# Patient Record
Sex: FEMALE | Employment: FULL TIME
[De-identification: names, ages, dates, MRNs, and addresses within clinical notes are randomized per-mention and may not be internally consistent; named-entity substitution may affect disease eponyms.]

---

## 2017-10-22 ENCOUNTER — HEALTH MAINTENANCE LETTER (OUTPATIENT)
Age: 27
End: 2017-10-22

## 2018-01-08 ENCOUNTER — NURSE TRIAGE (OUTPATIENT)
Dept: NURSING | Facility: CLINIC | Age: 28
End: 2018-01-08

## 2018-01-09 NOTE — TELEPHONE ENCOUNTER
"Patient is calling with question about whether she could delay \"gettin her period\" by skipping her placebo days and \"just starting another set of BCP's\". She has a SO coming to town from far away and she does not want to have her period. Advised that she discuss with her provider in the morning. Yesterday was already her first placebo day, and today was day 2 so it may already be too late.  Carola Lawson RN  [facility]    Reason for Disposition    [1] Caller requesting NON-URGENT health information AND [2] PCP's office is the best resource    Additional Information    Negative: [1] Caller is not with the adult (patient) AND [2] reporting urgent symptoms    Negative: Lab result questions    Negative: Medication questions    Negative: Caller cannot be reached by phone    Negative: Caller has already spoken to PCP or another triager    Negative: RN needs further essential information from caller in order to complete triage    Negative: Requesting regular office appointment    Protocols used: INFORMATION ONLY CALL-ADULT-      "

## 2019-01-02 ENCOUNTER — NURSE TRIAGE (OUTPATIENT)
Dept: NURSING | Facility: CLINIC | Age: 29
End: 2019-01-02

## 2019-01-02 NOTE — TELEPHONE ENCOUNTER
"Patient is in [place], unable to triage. \"I am suppose to go [facility]. So I was give Doxycycline to take 2 days before I went, which is today. But for the past couple of days I've been having diarrhea. Should I still take the antibiotic?\" Unable to triage, gave advice on being seen there to dx cause of diarrhea and check on hydration status. Also advised if you go [facility], yes take the medication given per MD, but also add BRAT diet and a probiotic and yogurt daily. If sx worsen advised to be seen there in [place].   "

## 2019-06-21 ENCOUNTER — MEDICAL CORRESPONDENCE (OUTPATIENT)
Dept: HEALTH INFORMATION MANAGEMENT | Facility: CLINIC | Age: 29
End: 2019-06-21

## 2019-06-21 ENCOUNTER — TRANSFERRED RECORDS (OUTPATIENT)
Dept: HEALTH INFORMATION MANAGEMENT | Facility: CLINIC | Age: 29
End: 2019-06-21

## 2019-06-24 ENCOUNTER — OFFICE VISIT (OUTPATIENT)
Dept: DERMATOLOGY | Facility: CLINIC | Age: 29
End: 2019-06-24
Payer: COMMERCIAL

## 2019-06-24 DIAGNOSIS — L30.0 NUMMULAR ECZEMA: Primary | ICD-10-CM

## 2019-06-24 RX ORDER — TRIAMCINOLONE ACETONIDE 1 MG/G
OINTMENT TOPICAL
Qty: 80 G | Refills: 1 | Status: SHIPPED | OUTPATIENT
Start: 2019-06-24

## 2019-06-24 RX ORDER — NORGESTIMATE AND ETHINYL ESTRADIOL 0.25-0.035
KIT ORAL DAILY
COMMUNITY
Start: 2019-06-02

## 2019-06-24 ASSESSMENT — PAIN SCALES - GENERAL: PAINLEVEL: NO PAIN (0)

## 2019-06-24 NOTE — PROGRESS NOTES
[facility] Dermatology Note      Dermatology Problem List:    1. Nummular eczema  -TAC 0.1% ointment  -Previously was on unknown topical antifungals and one course of PO antifungals without improvement    Encounter Date: Jun 24, 2019    CC:   Chief Complaint   Patient presents with     Derm Problem     Possible nummular dermatitis - referred by [place].        History of Present Illness:  Ms. Montse Lock is a 29 year old female who presents as a referral from [facility]. Briefly over a year ago, the pt developed a new, pink, itchy spot on her left shin. She was given a series of topical antifungals, but it never resolved. She was even given a course of oral antifungals, but again, it did not improve. The pt notes ist was particualrly itchy in December 2018. In the winter of 2019, she was in [facility] and [facility], and she thinks it got better. One month after she returned from [facility], she developed a new rash on her abdomen. It was slightly at first, but it has subsequently improved. Again, the pt used topical antifungals, but the rash did not go away. She is no longer developing new areas of rash. She was recently Rx'ed a new topical steroid on Friday, but she has not started using it. Otherwise, the pt denies any other general or skin-specific complaints at this time.     Past Medical History:   No hx of asthma, allergies, or eczema    Social History:  Patient reports that she has never smoked. She has never used smokeless tobacco.    Family History:  Family History   Problem Relation Age of Onset     Melanoma No family hx of      Skin Cancer No family hx of        Medications:  Current Outpatient Medications   Medication Sig Dispense Refill     MONO-LINYAH 0.25-35 MG-MCG tablet daily       triamcinolone (KENALOG) 0.1 % external ointment Apply one to two times a day to areas red rash when present or itchy. Do not apply to face or groin 80 g 1        No Known Allergies      Review of  Systems:  -Constitutional: Otherwise feeling well today, in usual state of health.  -Skin: As above in HPI. No additional skin concerns.    Physical exam:  Vitals: There were no vitals taken for this visit.  GEN: This is a well developed, well-nourished female in no acute distress, in a pleasant mood.    SKIN: Focused examination of the face, neck, left left leg abdomen, back, right and left hands, fingers, eyelids, conjunctiva, and lips was performed.  -There is a pink, arcuate plaque on the left lateral shin with a fine amount of scale. There are no other similar lesions noted on examination of this area. No PIH is appreciated  -There are salmon pink circular macules and plaques scattered on the abdomen. There is a larger one on the right inframammary fold.   -No other lesions of concern on areas examined.     KOH prep: a KOH prep of skin from plaques on the trunk was performed in clinic, and it was negative for any fungal elements today    Impression/Plan:  1. Nummular eczema    At this time, given the location, symptoms, and failure to response to antifungals--as well as negative KOH prep--we believe this is a dermatitis. We believe she has nummular eczema on her left leg. The abdomen could be resolving CA or also nummular eczema    We will treat with TAC 0.1% ointment BID PRN. Told pt to stop using topical antifungals      Follow-up prn for new or changing lesions.       Dr. Rodriguez staffed the patient.    Staff Involved:  Resident([place])/Staff

## 2019-06-24 NOTE — PROGRESS NOTES
I talked with and examined Montse Lock and I agree with the assessment and the plan. JOHN Rodriguez MD.

## 2019-06-24 NOTE — PATIENT INSTRUCTIONS
1. Apply triamcinolone one to two times a day when red spots are present or itchy. Do not apply this steroid to the face or groin

## 2019-06-24 NOTE — LETTER
6/24/2019       RE: Montse Lock  [address]  [address]  [place] [address]     Dear Colleague,    Thank you for referring your patient, Montse Lock, to the [facility] DERMATOLOGY at [facility]. Please see a copy of my visit note below.    [facility] Dermatology Note      Dermatology Problem List:    1. Nummular eczema  -TAC 0.1% ointment  -Previously was on unknown topical antifungals and one course of PO antifungals without improvement    Encounter Date: Jun 24, 2019    CC:   Chief Complaint   Patient presents with     Derm Problem     Possible nummular dermatitis - referred by [place].        History of Present Illness:  Ms. Montse Lock is a 29 year old female who presents as a referral from [facility]. Briefly over a year ago, the pt developed a new, pink, itchy spot on her left shin. She was given a series of topical antifungals, but it never resolved. She was even given a course of oral antifungals, but again, it did not improve. The pt notes ist was particualrly itchy in December 2018. In the winter of 2019, she was in [facility] and [facility], and she thinks it got better. One month after she returned from [facility], she developed a new rash on her abdomen. It was slightly at first, but it has subsequently improved. Again, the pt used topical antifungals, but the rash did not go away. She is no longer developing new areas of rash. She was recently Rx'ed a new topical steroid on Friday, but she has not started using it. Otherwise, the pt denies any other general or skin-specific complaints at this time.     Past Medical History:   No hx of asthma, allergies, or eczema    Social History:  Patient reports that she has never smoked. She has never used smokeless tobacco.    Family History:  Family History   Problem Relation Age of Onset     Melanoma No family hx of      Skin Cancer No family hx of        Medications:  Current Outpatient  Medications   Medication Sig Dispense Refill     MONO-LINYAH 0.25-35 MG-MCG tablet daily       triamcinolone (KENALOG) 0.1 % external ointment Apply one to two times a day to areas red rash when present or itchy. Do not apply to face or groin 80 g 1        No Known Allergies      Review of Systems:  -Constitutional: Otherwise feeling well today, in usual state of health.  -Skin: As above in HPI. No additional skin concerns.    Physical exam:  Vitals: There were no vitals taken for this visit.  GEN: This is a well developed, well-nourished female in no acute distress, in a pleasant mood.    SKIN: Focused examination of the face, neck, left left leg abdomen, back, right and left hands, fingers, eyelids, conjunctiva, and lips was performed.  -There is a pink, arcuate plaque on the left lateral shin with a fine amount of scale. There are no other similar lesions noted on examination of this area. No PIH is appreciated  -There are salmon pink circular macules and plaques scattered on the abdomen. There is a larger one on the right inframammary fold.   -No other lesions of concern on areas examined.     KOH prep: a KOH prep of skin from plaques on the trunk was performed in clinic, and it was negative for any fungal elements today    Impression/Plan:  1. Nummular eczema    At this time, given the location, symptoms, and failure to response to antifungals--as well as negative KOH prep--we believe this is a dermatitis. We believe she has nummular eczema on her left leg. The abdomen could be resolving RI or also nummular eczema    We will treat with TAC 0.1% ointment BID PRN. Told pt to stop using topical antifungals      Follow-up prn for new or changing lesions.       Dr. Rodriguez staffed the patient.    Staff Involved:  Resident([place])/Staff      I talked with and examined Montse Lock and I agree with the assessment and the plan. JOHN Rodriguez MD.

## 2019-06-24 NOTE — NURSING NOTE
Dermatology Rooming Note    Montse Lock's goals for this visit include:   Chief Complaint   Patient presents with     Derm Problem     Possible nummular dermatitis - referred by [place].      Myra Gifford, CMA

## 2019-08-03 ENCOUNTER — NURSE TRIAGE (OUTPATIENT)
Dept: NURSING | Facility: CLINIC | Age: 29
End: 2019-08-03

## 2019-08-03 NOTE — TELEPHONE ENCOUNTER
Missed three birth control pills and last menstruation was seven days ago.  Thursday night two nights ago had unprotected sex, but partner pulled out.  She is wondering how necessary it is to take ECP.  If she wants to help ensure a pregnancy is prevented, recommended she should take it, but it is ultimately up to her.    Reason for Disposition    Caller requests prescription for Emergency Contraceptive Pill(s)    Protocols used: CONTRACEPTION - EMERGENCY-A-

## 2020-02-24 ENCOUNTER — HEALTH MAINTENANCE LETTER (OUTPATIENT)
Age: 30
End: 2020-02-24

## 2020-12-13 ENCOUNTER — HEALTH MAINTENANCE LETTER (OUTPATIENT)
Age: 30
End: 2020-12-13

## 2021-01-31 ENCOUNTER — NURSE TRIAGE (OUTPATIENT)
Dept: NURSING | Facility: CLINIC | Age: 31
End: 2021-01-31

## 2021-01-31 NOTE — TELEPHONE ENCOUNTER
Triage Call:    -Patient calling wanting to discuss her mother's sx. Patient does not want to give mother's information to RN to make a chart for mother of patient.   -Patient is supporting her mom from a far as mother is in the [facility] currently.   -Patient had many questions about COVID-19 as she is concerned her mother has COVID-19.   -Patient is currently in [place], but her mother is in [place].   -Patient states that her mother has been sick with a sore throat, fever, headache and nausea.    -Patient states her mother the last 2 days has been very fatigued.   -Patients mothers oxygenation has been 93-97%.     -Patient wants to know what she could do to help her mother.   -RN advised that patients mother needs to call a local clinic or Urgent care in the area to get medical advice or call the [facility] hotline number to talk with someone on recommendations as to what patients mother should do regarding her sx.   -There was a nurse line number for the [facility] RN found and gave to patient to call on behalf of her mother or have patients mother call nurse line herself and speak to a Registered Nurse in the [facility].   -RN advised to patient that if her mother is having any chest pain or shortness of breath she needs to call 911 or go to ED.   -RN advised to patient to make sure her mother calls a local health facility or nurse line in the [facility] to get medical advice.     Patient was very thankful for the advise and will let her mother know this information. RN advised if patient has any other questions or concerns to call back.     Slime Rosales RN, BSN Nurse Triage Advisor 3:32 PM 1/31/2021       Additional Information    General information question, no triage required and triager able to answer question    Protocols used: INFORMATION ONLY CALL-A-    COVID 19 Nurse Triage Plan/Patient Instructions    Please be aware that novel coronavirus (COVID-19) may be  circulating in the community. If you develop symptoms such as fever, cough, or SOB or if you have concerns about the presence of another infection including coronavirus (COVID-19), please contact your health care provider or visit [email or web address].     Disposition/Instructions    Home care recommended. Follow home care protocol based instructions.    Thank you for taking steps to prevent the spread of this virus.  o Limit your contact with others.  o Wear a simple mask to cover your cough.  o Wash your hands well and often.    Resources    [facility]: About COVID-19: [email or web address]    CDC: What to Do If You're Sick: www.cdc.gov/coronavirus/2019-ncov/about/steps-when-sick.html    CDC: Ending Home Isolation: www.cdc.gov/coronavirus/2019-ncov/hcp/disposition-in-home-patients.html     CDC: Caring for Someone: www.cdc.gov/coronavirus/2019-ncov/if-you-are-sick/care-for-someone.html     [facility]: Interim Guidance for Hospital Discharge to Home: www.[facility].[place]./diseases/coronavirus/hcp/hospdischarge.pdf    [facility] clinical trials (COVID-19 research studies): clinicalaffairs.[facility]-clinical-trials     Below are the COVID-19 hotlines at the [place] Department of [facility] ([facility]). Interpreters are available.   o For health questions: Call 754-678-0871 or 1-608.154.9213 (7 a.m. to 7 p.m.)  o For questions about schools and childcare: Call 636-832-4203 or 1-734.145.4817 (7 a.m. to 7 p.m.)

## 2021-04-17 ENCOUNTER — HEALTH MAINTENANCE LETTER (OUTPATIENT)
Age: 31
End: 2021-04-17

## 2021-05-09 ENCOUNTER — NURSE TRIAGE (OUTPATIENT)
Dept: NURSING | Facility: CLINIC | Age: 31
End: 2021-05-09

## 2021-05-10 NOTE — TELEPHONE ENCOUNTER
Pt called stating she forgot to remove her internal tampon for 24 hours, and now she is worried about toxic syndrome and freaking out. Pt reported after we talked about I feel like I have cramps.     Home care advice provided, and pt was advised to call back if she notices fever, pain, rash, unusual vaginal discharge, bad order, bleeding or she becomes worse. Pt verbalized understanding and stated okay.    Nick Gomes RN  [facility]     COVID 19 Nurse Triage Plan/Patient Instructions    Please be aware that novel coronavirus (COVID-19) may be circulating in the community. If you develop symptoms such as fever, cough, or SOB or if you have concerns about the presence of another infection including coronavirus (COVID-19), please contact your health care provider or visit [email or web address].     Disposition/Instructions    Home care recommended. Follow home care protocol based instructions.    Thank you for taking steps to prevent the spread of this virus.  o Limit your contact with others.  o Wear a simple mask to cover your cough.  o Wash your hands well and often.    Resources    [facility]: About COVID-19: [email or web address]    CDC: What to Do If You're Sick: www.cdc.gov/coronavirus/2019-ncov/about/steps-when-sick.html    CDC: Ending Home Isolation: www.cdc.gov/coronavirus/2019-ncov/hcp/disposition-in-home-patients.html     CDC: Caring for Someone: www.cdc.gov/coronavirus/2019-ncov/if-you-are-sick/care-for-someone.html     [facility]: Interim Guidance for Hospital Discharge to Home: www.health.[facility].[place].us/diseases/coronavirus/hcp/hospdischarge.pdf    [facility] clinical trials (COVID-19 research studies): clinicalaffairs.[facility]-clinical-trials     Below are the COVID-19 hotlines at the [place] Department of Health ([facility]). Interpreters are available.   o For health questions: Call 087-052-5898 or 1-904.143.1038 (7 a.m. to 7 p.m.)  o For questions  about schools and childcare: Call 098-063-8223 or 1-764.998.9134 (7 a.m. to 7 p.m.)                     Reason for Disposition    [1] Retained tampon in vagina AND [2] questions about how to remove it    Additional Information    Negative: Shock suspected (e.g., cold/pale/clammy skin, too weak to stand, low BP, rapid pulse)    Negative: Sounds like a life-threatening emergency to the triager    Negative: FB is sharp    Negative: Bleeding from the vagina  [Exception: patient denies injury and knows that the blood is from menstrual period]    Negative: FB causes pain or discomfort    Negative: Unable to urinate (can't pass urine)    Negative: Possibility of sexual assault    Negative: [1] Retained tampon AND [2] fever    Negative: [1] Retained tampon AND [2] new rash    Negative: Patient sounds very sick or weak to the triager    Negative: [1] Vaginal FB AND [2] can't remove at home    Negative: [1] Vaginal FB removed AND [2] unusual vaginal discharge or bad odor occurs    Protocols used: VAGINAL FOREIGN BODY-A-AH

## 2021-09-26 ENCOUNTER — HEALTH MAINTENANCE LETTER (OUTPATIENT)
Age: 31
End: 2021-09-26

## 2021-12-24 ENCOUNTER — NURSE TRIAGE (OUTPATIENT)
Dept: NURSING | Facility: CLINIC | Age: 31
End: 2021-12-24

## 2021-12-24 NOTE — TELEPHONE ENCOUNTER
Patient calling reporting symptoms starting in past 2 days with fever, cough.   Patient is fully vaccinated for COVID 19 and has booster.    Frequent cough. Patient has COVID 19 PCR test in process.   Stating home rapid test was negative.    Reporting temp ranging to 103.4 Temporal now. Last dose of Tylenol taken at 7 a.m. today.   Reporting fatigue, decreased appetite. Denies difficulty breathing.  Disposition to see provider with in 4 hours.    Reviewed Urgent Care locations. Patient unsure what she will do. Reviewed [employer] Website information on after hours sites and phone numbers.    Vee Fairbanks RN  [facility]      COVID 19 Nurse Triage Plan/Patient Instructions    Please be aware that novel coronavirus (COVID-19) may be circulating in the community. If you develop symptoms such as fever, cough, or SOB or if you have concerns about the presence of another infection including coronavirus (COVID-19), please contact your health care provider or visit [email or web address].     Disposition/Instructions    In-Person Visit with provider recommended. Reference Visit Selection Guide.    Thank you for taking steps to prevent the spread of this virus.  o Limit your contact with others.  o Wear a simple mask to cover your cough.  o Wash your hands well and often.    Resources    [facility]: About COVID-19: [email or web address]    CDC: What to Do If You're Sick: www.cdc.gov/coronavirus/2019-ncov/about/steps-when-sick.html    CDC: Ending Home Isolation: www.cdc.gov/coronavirus/2019-ncov/hcp/disposition-in-home-patients.html     CDC: Caring for Someone: www.cdc.gov/coronavirus/2019-ncov/if-you-are-sick/care-for-someone.html     [facility]: Interim Guidance for Hospital Discharge to Home: www.health.[facility].mn.us/diseases/coronavirus/hcp/hospdischarge.pdf    [facility] clinical trials (COVID-19 research studies): clinicalaffairs.[facility]/n-clinical-trials     Below are the  COVID-19 hotlines at the [place] Department of [facility] ([facility]). Interpreters are available.   o For health questions: Call 003-169-5028 or 1-885.962.7959 (7 a.m. to 7 p.m.)  o For questions about schools and childcare: Call 758-620-2312 or 1-421.785.5781 (7 a.m. to 7 p.m.)                     Reason for Disposition    Fever > 103 F (39.4 C)    Additional Information    Negative: SEVERE difficulty breathing (e.g., struggling for each breath, speaks in single words)    Negative: Difficult to awaken or acting confused (e.g., disoriented, slurred speech)    Negative: Bluish (or gray) lips or face now    Negative: Shock suspected (e.g., cold/pale/clammy skin, too weak to stand, low BP, rapid pulse)    Negative: Sounds like a life-threatening emergency to the triager    Negative: [1] COVID-19 exposure AND [2] no symptoms    Negative: COVID-19 vaccine reaction suspected (e.g., fever, headache, muscle aches) occurring 1 to 3 days after getting vaccine    Negative: COVID-19 vaccine, questions about    Negative: [1] Lives with someone known to have influenza (flu test positive) AND [2] flu-like symptoms (e.g., cough, runny nose, sore throat, SOB; with or without fever)    Negative: [1] Adult with possible COVID-19 symptoms AND [2] triager concerned about severity of symptoms or other causes    Negative: COVID-19 and breastfeeding, questions about    Negative: SEVERE or constant chest pain or pressure (Exception: mild central chest pain, present only when coughing)    Negative: MODERATE difficulty breathing (e.g., speaks in phrases, SOB even at rest, pulse 100-120)    Negative: [1] Headache AND [2] stiff neck (can't touch chin to chest)    Negative: MILD difficulty breathing (e.g., minimal/no SOB at rest, SOB with walking, pulse <100)    Negative: Chest pain or pressure    Negative: Patient sounds very sick or weak to the triager    Protocols used: CORONAVIRUS (COVID-19) DIAGNOSED OR [id number] 8.25.2021

## 2022-05-08 ENCOUNTER — HEALTH MAINTENANCE LETTER (OUTPATIENT)
Age: 32
End: 2022-05-08

## 2023-01-14 ENCOUNTER — HEALTH MAINTENANCE LETTER (OUTPATIENT)
Age: 33
End: 2023-01-14

## 2023-05-09 ENCOUNTER — NURSE TRIAGE (OUTPATIENT)
Dept: NURSING | Facility: CLINIC | Age: 33
End: 2023-05-09

## 2023-05-10 NOTE — TELEPHONE ENCOUNTER
Takes Prozac in am.  and Hydroxazine prn. Took extra dose of Prozac 30 mg dosing (10 and 20 mg) Took 9a, 935pm    Reason for Disposition    Drug overdose and triager unable to answer question    MORE THAN A DOUBLE DOSE of a prescription or over-the-counter (OTC) drug    Additional Information    Negative: SEVERE difficulty breathing (e.g., struggling for each breath, speaks in single words)    Negative: Bluish (or gray) lips or face now    Negative: Seizure    Negative: Difficult to awaken or acting confused (e.g., disoriented, slurred speech)    Negative: Shock suspected (e.g., cold/pale/clammy skin, too weak to stand, low BP, rapid pulse)    Negative: [1] Intentional overdose AND [2] suicidal thoughts or ideas    Negative: Suicide attempt, known or suspected    Negative: Sounds like a life-threatening emergency to the triager    Negative: [1] HARMFUL SUBSTANCE or ACID or ALKALI ingestion (e.g., toilet , drain , lye, Clinitest tablets, ammonia, bleaches) AND [2] any symptoms (e.g., mouth pain, sore throat, breathing difficulty)    Negative: [1] PETROLEUM PRODUCT ingestion (e.g., kerosene, gasoline, benzene, furniture polish, lighter fluid) AND [2] any symptoms (e.g., breathing difficulty, coughing, vomiting)    Negative: [1] Poison Center advised caller to go to ED AND [2] caller seeking second opinion    Negative: Patient sounds very sick or weak to the triager    Negative: [1] HARMFUL SUBSTANCE or ACID or ALKALI ingestion (e.g., toilet , drain , lye, Clinitest tablets, ammonia, bleaches) AND [2] NO symptoms    Negative: [1] PETROLEUM PRODUCT ingestion (e.g., kerosene, gasoline, benzene, furniture polish, lighter fluid) AND [2] NO symptoms    Protocols used: MEDICATION QUESTION CALL-A-AH, POISONING-A-AH

## 2023-06-02 ENCOUNTER — HEALTH MAINTENANCE LETTER (OUTPATIENT)
Age: 33
End: 2023-06-02

## 2023-09-11 ENCOUNTER — NURSE TRIAGE (OUTPATIENT)
Dept: NURSING | Facility: CLINIC | Age: 33
End: 2023-09-11

## 2023-09-12 NOTE — TELEPHONE ENCOUNTER
Patient reporting tested positive for Covid 9-11-23.  Temp 99 chills. Oxymetry 97%.  High risk factors are overweight and anxiety.    Disposition is to call PCP within 24 hours.  PCP danika Dykes.  Patient will call in the morning.    COVID Positive/Requesting COVID treatment    Patient is positive for COVID and requesting treatment options.    Date of positive COVID test (PCR or at home)? 9-11-23  Current COVID symptoms: fever or chills, cough, muscle or body aches, headache, and sore throat  Date COVID symptoms began: 9-11-23    Message should be routed to clinic RN pool. Best phone number to use for call back: [phone].  Patient will call clinic in the morning.    Wendy Mcdermott RN  [facility]        Reason for Disposition   [1] HIGH RISK patient (e.g., weak immune system, age > 64 years, obesity with BMI 30 or higher, pregnant, chronic lung disease or other chronic medical condition) AND [2] COVID symptoms (e.g., cough, fever)  (Exceptions: Already seen by PCP and no new or worsening symptoms.)    Additional Information   Negative: SEVERE difficulty breathing (e.g., struggling for each breath, speaks in single words)   Negative: Difficult to awaken or acting confused (e.g., disoriented, slurred speech)   Negative: Bluish (or gray) lips or face now   Negative: Shock suspected (e.g., cold/pale/clammy skin, too weak to stand, low BP, rapid pulse)   Negative: Sounds like a life-threatening emergency to the triager   Negative: SEVERE or constant chest pain or pressure  (Exception: Mild central chest pain, present only when coughing.)   Negative: MODERATE difficulty breathing (e.g., speaks in phrases, SOB even at rest, pulse 100-120)   Negative: [1] Headache AND [2] stiff neck (can't touch chin to chest)   Negative: Oxygen level (e.g., pulse oximetry) 90 percent or lower   Negative: Chest pain or pressure  (Exception: MILD central chest pain, present only when coughing.)   Negative: [1] Drinking very  huang AND [2] dehydration suspected (e.g., no urine > 12 hours, very dry mouth, very lightheaded)   Negative: Patient sounds very sick or weak to the triager   Negative: MILD difficulty breathing (e.g., minimal/no SOB at rest, SOB with walking, pulse <100)   Negative: Fever > 103 F (39.4 C)   Negative: [1] Fever > 101 F (38.3 C) AND [2] age > 60 years   Negative: [1] Fever > 100.0 F (37.8 C) AND [2] bedridden (e.g., CVA, chronic illness, recovering from surgery)   Negative: Oxygen level (e.g., pulse oximetry) 91 to 94 percent    Protocols used: Coronavirus (COVID-19) Diagnosed or [id number]